# Patient Record
Sex: MALE | Race: BLACK OR AFRICAN AMERICAN | NOT HISPANIC OR LATINO | Employment: FULL TIME | URBAN - METROPOLITAN AREA
[De-identification: names, ages, dates, MRNs, and addresses within clinical notes are randomized per-mention and may not be internally consistent; named-entity substitution may affect disease eponyms.]

---

## 2021-12-12 ENCOUNTER — APPOINTMENT (OUTPATIENT)
Dept: CT IMAGING | Facility: HOSPITAL | Age: 36
End: 2021-12-12

## 2021-12-12 ENCOUNTER — HOSPITAL ENCOUNTER (EMERGENCY)
Facility: HOSPITAL | Age: 36
Discharge: HOME | End: 2021-12-12
Attending: EMERGENCY MEDICINE | Admitting: EMERGENCY MEDICINE

## 2021-12-12 VITALS
SYSTOLIC BLOOD PRESSURE: 164 MMHG | HEIGHT: 73 IN | BODY MASS INDEX: 41.75 KG/M2 | TEMPERATURE: 97.6 F | OXYGEN SATURATION: 97 % | HEART RATE: 84 BPM | WEIGHT: 315 LBS | DIASTOLIC BLOOD PRESSURE: 108 MMHG | RESPIRATION RATE: 16 BRPM

## 2021-12-12 DIAGNOSIS — S01.01XA LACERATION OF SCALP, INITIAL ENCOUNTER: ICD-10-CM

## 2021-12-12 DIAGNOSIS — S09.90XA MINOR HEAD INJURY, INITIAL ENCOUNTER: Primary | ICD-10-CM

## 2021-12-12 LAB — QT INTERVAL: 325 MS

## 2021-12-12 PROCEDURE — 93010 ELECTROCARDIOGRAM REPORT: CPT | Performed by: INTERNAL MEDICINE

## 2021-12-12 PROCEDURE — 90471 IMMUNIZATION ADMIN: CPT | Performed by: EMERGENCY MEDICINE

## 2021-12-12 PROCEDURE — 99283 EMERGENCY DEPT VISIT LOW MDM: CPT

## 2021-12-12 PROCEDURE — 25010000002 TETANUS-DIPHTH-ACELL PERTUSSIS 5-2.5-18.5 LF-MCG/0.5 SUSPENSION PREFILLED SYRINGE: Performed by: EMERGENCY MEDICINE

## 2021-12-12 PROCEDURE — 90715 TDAP VACCINE 7 YRS/> IM: CPT | Performed by: EMERGENCY MEDICINE

## 2021-12-12 PROCEDURE — 70450 CT HEAD/BRAIN W/O DYE: CPT

## 2021-12-12 PROCEDURE — 93005 ELECTROCARDIOGRAM TRACING: CPT

## 2021-12-12 RX ORDER — LIDOCAINE HYDROCHLORIDE AND EPINEPHRINE 10; 10 MG/ML; UG/ML
10 INJECTION, SOLUTION INFILTRATION; PERINEURAL ONCE
Status: COMPLETED | OUTPATIENT
Start: 2021-12-12 | End: 2021-12-12

## 2021-12-12 RX ADMIN — LIDOCAINE HYDROCHLORIDE AND EPINEPHRINE 5 ML: 10; 10 INJECTION, SOLUTION INFILTRATION; PERINEURAL at 12:12

## 2021-12-12 RX ADMIN — TETANUS TOXOID, REDUCED DIPHTHERIA TOXOID AND ACELLULAR PERTUSSIS VACCINE, ADSORBED 0.5 ML: 5; 2.5; 8; 8; 2.5 SUSPENSION INTRAMUSCULAR at 12:12

## 2021-12-12 NOTE — ED TRIAGE NOTES
Pt reports he was sitting on the the edge of the bed and had a syncopal episode. Pt reports he hit the top of his head on the dresser. Pt denies blood thinners.     Pt was wearing a mask during assessment.  This RN wore appropriate PPE

## 2021-12-12 NOTE — ED PROVIDER NOTES
EMERGENCY DEPARTMENT ENCOUNTER    Room Number:  15/15  Date seen:  12/12/2021  PCP: Provider, No Known  Historian: Patient      HPI:  Chief Complaint: Head injury, scalp laceration  A complete HPI/ROS/PMH/PSH/SH/FH are unobtainable due to: Nothing  Context: Wilfrido Andrade is a 36 y.o. male who presents to the ED c/o head injury and scalp laceration occurred about an hour ago.  Patient reports he was sitting on the edge of his bed getting ready for work when he fell forward and hit his head on the edge of a dresser.  He does not believe that he lost consciousness.  He reports that he has bleeding from the top of the scalp.  He has not had a tetanus booster within the last 5 years.  He does complain of mild headache.  He denies nausea or vomiting.  He takes no blood thinner medications.  He denies neck pain.  He denies any other injury.            PAST MEDICAL HISTORY  Active Ambulatory Problems     Diagnosis Date Noted   • No Active Ambulatory Problems     Resolved Ambulatory Problems     Diagnosis Date Noted   • No Resolved Ambulatory Problems     Past Medical History:   Diagnosis Date   • Hypertension          PAST SURGICAL HISTORY  History reviewed. No pertinent surgical history.      FAMILY HISTORY  History reviewed. No pertinent family history.      SOCIAL HISTORY  Social History     Socioeconomic History   • Marital status: Single         ALLERGIES  Patient has no known allergies.        REVIEW OF SYSTEMS  Review of Systems   Review of all 14 systems is negative other than stated in the HPI above.      PHYSICAL EXAM  ED Triage Vitals [12/12/21 1037]   Temp Heart Rate Resp BP SpO2   97.6 °F (36.4 °C) 116 18 -- 96 %      Temp src Heart Rate Source Patient Position BP Location FiO2 (%)   -- Monitor -- -- --         GENERAL: Awake and alert, no acute distress  HENT: nares patent, 3 cm laceration on the top of the scalp with no active bleeding, no obvious foreign body  EYES: no scleral icterus, pupils 3 mm reactive  bilaterally, EOMI  CV: regular rhythm, normal rate  RESPIRATORY: normal effort  ABDOMEN: soft, nondistended, nontender throughout  MUSCULOSKELETAL: no deformity, no midline cervical spine tenderness or step-off  NEURO: alert, moves all extremities, follows commands  PSYCH:  calm, cooperative  SKIN: warm, dry, 3 cm laceration on the top of the scalp, approximately 4 mm in depth, dried blood present, no active bleeding    Vital signs and nursing notes reviewed.          LAB RESULTS  Recent Results (from the past 24 hour(s))   ECG 12 Lead    Collection Time: 12/12/21 10:41 AM   Result Value Ref Range    QT Interval 325 ms       Ordered the above labs and reviewed the results.        RADIOLOGY  CT Head Without Contrast    Result Date: 12/12/2021  CT HEAD WO CONTRAST-  CLINICAL HISTORY: Syncope. Head injury. Scalp laceration.  TECHNIQUE: Transverse 3 mm thick images were acquired from the base of the skull to the vertex without IV contrast.  Radiation dose reduction techniques were utilized, including automated exposure control and exposure modulation based on body size.  COMPARISON: None  FINDINGS: The brain and ventricular system appear structurally normal. There is no evidence of recent or old intracranial hemorrhage or infarction. There are no masses. The visualized paranasal sinuses are well aerated. Bone window images demonstrate no evidence of skull fracture.      Normal CT scan of the head.  This report was finalized on 12/12/2021 12:41 PM by Dr. Nick Melgoza M.D.        Ordered the above noted radiological studies. Reviewed by me in PACS.            PROCEDURES  Laceration Repair    Date/Time: 12/12/2021 3:31 PM  Performed by: Vinny Hoffmann MD  Authorized by: Vinny Hoffmann MD     Consent:     Consent obtained:  Verbal    Consent given by:  Patient  Anesthesia (see MAR for exact dosages):     Anesthesia method:  Local infiltration    Local anesthetic:  Lidocaine 1% WITH epi  Laceration details:      Location:  Scalp    Scalp location:  L parietal    Length (cm):  3    Depth (mm):  4  Treatment:     Area cleansed with:  Hibiclens and saline    Amount of cleaning:  Standard    Irrigation solution:  Sterile saline    Irrigation method:  Pressure wash  Skin repair:     Repair method:  Staples    Number of staples:  7  Approximation:     Approximation:  Close  Post-procedure details:     Dressing:  Open (no dressing)    Patient tolerance of procedure:  Tolerated well, no immediate complications                  MEDICATIONS GIVEN IN ER  Medications   lidocaine 1% - EPINEPHrine [id number] (XYLOCAINE W/EPI) 1 %-1:[id number] injection 10 mL (5 mL Injection Given by Other 12/12/21 1212)   Tetanus-Diphth-Acell Pertussis (BOOSTRIX) injection 0.5 mL (0.5 mL Intramuscular Given 12/12/21 1212)                   MEDICAL DECISION MAKING, PROGRESS, and CONSULTS    All labs have been independently reviewed by me.  All radiology studies have been reviewed by me and discussed with radiologist dictating the report.   EKG's independently viewed and interpreted by me.  Discussion below represents my analysis of pertinent findings related to patient's condition, differential diagnosis, treatment plan and final disposition.      Differential diagnosis includes but is not limited to:  Acute traumatic intracranial hemorrhage  Scalp laceration  Skull fracture      ED Course as of 12/12/21 1533   Sun Dec 12, 2021   1113 EKG          EKG time: 1041  Rhythm/Rate: Sinus rhythm, 89  P waves and UT: Normal  QRS, axis: Normal axis  ST and T waves: No acute ischemic changesNo delta waveNo BrugadaNo QT prolongation    Interpreted Contemporaneously by me, independently viewed         [JR]   1237 CT brain independently interpreted by me in PACS.  There is no evidence of acute intracranial hemorrhage, skull fracture, or hydrocephalus. [JR]   1252 Patient informed of reassuring CT brain findings.  He tolerated staple closure of his scalp laceration well.   Tdap has been updated.  Patient appropriate discharge home with staple removal in 10 days.  Return precautions were discussed. [JR]      ED Course User Index  [JR] Vinny Hoffmann MD              I wore an N95 mask, face shield, and gloves during this patient encounter.  Patient also wearing a surgical mask.  Hand hygeine performed before and after seeing the patient.    DIAGNOSIS  Final diagnoses:   Minor head injury, initial encounter   Laceration of scalp, initial encounter         DISPOSITION  DISCHARGE    Patient discharged in stable condition.    Reviewed implications of results, diagnosis, meds, responsibility to follow up, warning signs and symptoms of possible worsening, potential complications and reasons to return to ER.    Patient/Family voiced understanding of above instructions.    Discussed plan for discharge, as there is no emergent indication for admission. Patient referred to primary care provider for BP management due to today's BP. Pt/family is agreeable and understands need for follow up and repeat testing.  Pt is aware that discharge does not mean that nothing is wrong but it indicates no emergency is present that requires admission and they must continue care with follow-up as given below or physician of their choice.     FOLLOW-UP  Urgent Care  for staple removal in 10 days             Medication List      No changes were made to your prescriptions during this visit.                   Latest Documented Vital Signs:  As of 15:33 EST  BP- (!) 164/108 HR- 84 Temp- 97.6 °F (36.4 °C) O2 sat- 97%        --    Please note that portions of this were completed with a voice recognition program.          Vinny Hoffmann MD  12/12/21 1570